# Patient Record
Sex: FEMALE | Race: WHITE | NOT HISPANIC OR LATINO | URBAN - METROPOLITAN AREA
[De-identification: names, ages, dates, MRNs, and addresses within clinical notes are randomized per-mention and may not be internally consistent; named-entity substitution may affect disease eponyms.]

---

## 2022-02-08 ENCOUNTER — TELEPHONE (OUTPATIENT)
Dept: OBSTETRICS AND GYNECOLOGY | Facility: CLINIC | Age: 32
End: 2022-02-08
Payer: MEDICAID

## 2022-02-08 NOTE — TELEPHONE ENCOUNTER
----- Message from Rosy Humphries sent at 2/8/2022  4:14 PM CST -----  Contact: Jasmyn Varner is requesting a call to schedule an appointment. Please call her back at [phone] or [phone].            Thanks  DD

## 2022-02-08 NOTE — TELEPHONE ENCOUNTER
Returned call to pt to schedule appt. Pt wanted to schedule for [facility]. Transferred pt to telephone services to be scheduled by appropriate region.